# Patient Record
Sex: FEMALE | Race: WHITE | URBAN - METROPOLITAN AREA
[De-identification: names, ages, dates, MRNs, and addresses within clinical notes are randomized per-mention and may not be internally consistent; named-entity substitution may affect disease eponyms.]

---

## 2020-05-04 ENCOUNTER — HISTORICAL (OUTPATIENT)
Dept: RADIOLOGY | Facility: HOSPITAL | Age: 60
End: 2020-05-04

## 2020-05-06 ENCOUNTER — HISTORICAL (OUTPATIENT)
Dept: ADMINISTRATIVE | Facility: HOSPITAL | Age: 60
End: 2020-05-06

## 2020-05-06 LAB
ABS NEUT (OLG): 3.55 X10(3)/MCL (ref 2.1–9.2)
BASOPHILS # BLD AUTO: 0 X10(3)/MCL (ref 0–0.2)
BASOPHILS NFR BLD AUTO: 1 %
EOSINOPHIL # BLD AUTO: 0.4 X10(3)/MCL (ref 0–0.9)
EOSINOPHIL NFR BLD AUTO: 6 %
ERYTHROCYTE [DISTWIDTH] IN BLOOD BY AUTOMATED COUNT: 12.7 % (ref 11.5–14.5)
HCT VFR BLD AUTO: 38.6 % (ref 35–46)
HGB BLD-MCNC: 12.6 GM/DL (ref 12–16)
IMM GRANULOCYTES # BLD AUTO: 0.02 10*3/UL
IMM GRANULOCYTES NFR BLD AUTO: 0 %
LYMPHOCYTES # BLD AUTO: 2.1 X10(3)/MCL (ref 0.6–4.6)
LYMPHOCYTES NFR BLD AUTO: 32 %
MCH RBC QN AUTO: 29 PG (ref 26–34)
MCHC RBC AUTO-ENTMCNC: 32.6 GM/DL (ref 31–37)
MCV RBC AUTO: 88.9 FL (ref 80–100)
MONOCYTES # BLD AUTO: 0.4 X10(3)/MCL (ref 0.1–1.3)
MONOCYTES NFR BLD AUTO: 7 %
NEUTROPHILS # BLD AUTO: 3.55 X10(3)/MCL (ref 2.1–9.2)
NEUTROPHILS NFR BLD AUTO: 54 %
PLATELET # BLD AUTO: 243 X10(3)/MCL (ref 130–400)
PMV BLD AUTO: 10.1 FL (ref 7.4–10.4)
RBC # BLD AUTO: 4.34 X10(6)/MCL (ref 4–5.2)
WBC # SPEC AUTO: 6.6 X10(3)/MCL (ref 4.5–11)

## 2020-05-07 ENCOUNTER — HISTORICAL (OUTPATIENT)
Dept: ADMINISTRATIVE | Facility: HOSPITAL | Age: 60
End: 2020-05-07

## 2020-05-12 ENCOUNTER — HISTORICAL (OUTPATIENT)
Dept: SURGERY | Facility: HOSPITAL | Age: 60
End: 2020-05-12

## 2022-04-10 ENCOUNTER — HISTORICAL (OUTPATIENT)
Dept: ADMINISTRATIVE | Facility: HOSPITAL | Age: 62
End: 2022-04-10

## 2022-04-29 VITALS
OXYGEN SATURATION: 100 % | BODY MASS INDEX: 26.37 KG/M2 | HEIGHT: 67 IN | SYSTOLIC BLOOD PRESSURE: 130 MMHG | DIASTOLIC BLOOD PRESSURE: 79 MMHG | WEIGHT: 168 LBS

## 2022-05-04 NOTE — HISTORICAL [facility] CERNER
This is a historical note converted from [employer]. Formatting and pictures may have been removed.  Please reference [employer] for original formatting and attached multimedia. History of Present Illness  Patient is a 59?postmenopausal ?with LMP . She was originally referred to our clinic for gyn well woman exam with noted prolapsing cervical mass that Dr. Gambino biopsied in clinic on 2020. Pathology resulted as benign endocervical polyp. Patient reports episode of PMB. FamHx is notable for mother with PMB and uterine cancer in her 70s. Denies any current bleeding, has only had some spotting.?  ?  Gynecological History  Menstrual Status Intake: Postmenopausal  H/o BTL at age 29  MMG 2017 normal per patient, has one scheduled 2020  Pap 2017 normal per patient   x2  ?   SocialHx: Works as a /personal business. Her support is a local friend who will help take care of her in the postoperative setting. Juliana is out of state in [place].  Review of Systems  CONSTITUTIONAL: No unintended weight loss, subjective?fever, chills, weakness or fatigue.  HEENT:  -Eyes: No vision loss or?blurred vision.  -Ears, Nose, Throat: No hearing loss, sneezing, congestion, runny nose or sore throat.  SKIN: No rash or itching.  CARDIOVASCULAR: No chest pain, pressure or discomfort. No palpitations or edema.  RESPIRATORY: No shortness of breath, cough, or sputum.  GASTROINTESTINAL: No nausea, vomiting, constipation or diarrhea. No abdominal pain. No hematochezia.  GENITOURINARY: No hematuria or dysuria.  NEUROLOGICAL: No headache, dizziness, numbness or tingling in the extremities. No change in bowel or bladder control.  MUSCULOSKELETAL: No muscle, back pain, joint pain or stiffness.  Physical Exam  General Appearance: WNWD athletic female appearing her stated age. Alert, cooperative, in no acute distress  HEENT: NCAT, EOMI  Heart: Regular rate and rhythm, no murmur, rub or gallop?  Lungs: Clear to auscultation  bilaterally, respirations unlabored?  Abdomen: Soft, non-tender, non-distended abdomen with bowel sounds active all four quadrants, no masses, no organomegaly.  Extremities: Extremities normal, atraumatic, no cyanosis or edema. No calf tenderness bilaterally?  Skin: Warm, dry, intact with no rashes or lesions  ?   Pelvic Exam as Documented by Dr. Gambino 4/1/2020  BME: small? uterus, antiflexed  Speculum:? atrophic vaginal epithelium, 4x3 cm prolapsing mass with 1.5 cm stalk noted from endocervix, unable to visualize cervix.? No evidence of malignancy with extension of mass beyond lower endocervix.  Assessment/Plan  1.?Postmenopausal bleeding?N95.0  Counseling: Discussed differential diagnosis for?postmenopausal bleeding?including endometrial polyp, leiomyoma, uterine malignancy. Patient risk factors for endometrial hyperplasia/cancer reviewed. Indication for endometrial sampling reviewed. Alternatives to this planned procedure were explained to the patient, including medical and other surgical management.? This procedure and its risks, benefits, complications (including injury to bowel, bladder, major blood vessel, ureter, bleeding, possibility of transfusion, infection, scarring, dyspareunia, erosion, further surgery, incontinence, failure of the procedure, or fistula formation). We also discussed the risk of increased risk for uterine scarring (Asherman Syndrome).? Additional risks specific to this patient and procedure include uterine perforation, inability to obtain sample due to stenosis and the need for possible LEEP to access endometrial cavity.??Patient counseled on risk of blood transfusion including but not limited to allergic reaction, transmission of HIV, Hep C 1:2 million, transmission of Hep B 1:250,000.  2.?Endocervical polyp?N84.1  Noted on pathology from office biopsies 4/1/2020. Plan for hysteroscopic resection of endocervical polyp. See above for additional counseling.  3.?Preop  examination?Z01.818  PACE appointment requested.  Consents reviewed with patient and signed.  Labs today: CBC  Labs DOS: None  Meds DOS: None  Caprini score?2; SCDs for DVT prophylaxis  Abx: None indicated  Discussed?outpatient surgery expectations and recovery time.  ?   To OR for Hysteroscopy D&C on 5/12/2020 with Dr. Rose Alonzo  Post-op appt on 5/29/20   Problem List/Past Medical History  Ongoing  Hypertension  Historical  Hypertension  Pregnant  Pregnant  Procedure/Surgical History  Tubal ligation   Medications  lisinopril 10 mg oral tablet, 10 mg= 1 tab(s), Oral, Daily  Allergies  No Known Medication Allergies  Social History  Abuse/Neglect  No, 04/01/2020  Alcohol  Current, 1-2 times per month, 04/01/2020  Employment/School  Employed, 04/01/2020  Exercise  Exercise duration: 45. Exercise frequency: 5-6 times/week. Exercise type: Cross Fit and Boot Camp., 04/01/2020  Home/Environment  Lives with Alone., 04/01/2020  Nutrition/Health  Regular, 04/01/2020  Sexual  Sexually active: No. Sexual orientation: Straight or heterosexual. No, 04/01/2020  Substance Use  Past, Marijuana, 04/01/2020  Tobacco  Never (less than 100 in lifetime), N/A, 04/01/2020  Marital Status    Family History  Breast cancer: Sister.  Cancer: Mother.Negative: Father.  Uterine cancer: Mother.  Diagnostic Results  (05/04/2020 09:45 CDT US Pelvic Non-OB w Transvag if needed)  FINDINGS:  The uterus measures 7 cm in length. Endometrial stripe thickness 6-7  mm as visualized. Multiple uterine lesions noted measuring up to 2-3  cm as visualized sonographically. Right ovary unremarkable and the  left is not visualized. No sizable ascites.  ?  IMPRESSION:  Probable leiomyomata [1]     [1]?US Pelvic Non-OB w Transvag if needed; Lior YOUSSEF, Josie Ernst 05/04/2020 09:45 CDT   I have seen this patient and agree with note above.  Prolapsing cervical polyp with other fibroids on US-? I explained there may be additional endometrial fibroids present  and if amenable to removal, we will do so.  ?   Informed consent obtained, specifically I reiterated the risks of: persistent bleeding, infection, *Leaving other uterine fibroids (she has larger intramural/submucosal that will remain), uterine perforation, inability to obtain adequate sample, finding of malignancy, medical conditions from undergoing general anesthetic, venous thromboembolism, or other unpredicted risks.  Patient able to describe planned procedure in her own words.  Expected recovery time reviewed, as well as normal postoperative course.

## 2022-05-04 NOTE — HISTORICAL [facility] CERNER
This is a historical note converted from [employer]. Formatting and pictures may have been removed.  Please reference [employer] for original formatting and attached multimedia. Date of surgery: 5/12/2020  ?   Preop diagnosis: Prolapsing Endometrial Mass, Suspected Leiomyoma  ?   Postop diagnosis: Same  ?   Procedure: Hysteroscopic Myomectomy,?D&C  ?  Staff physician: Dr. Rose Alonzo  ?  Resident physician: Dr. Austin Karimi ([id number]), surgeon and Dr. Jana Linton ([id number]), assist  ?  Anesthesia: General Endotracheal?  ?  IVF:? 1000 mL  ?  UOP: ?30 mL clear yellow urine?  ?  EBL: 5 mL  ?  Hysteroscopic Fluid Deficit:? 60 mL  Complications: none?  Findings: EUA revealed?normal external female?genitalia. Uterus small, mobile, cervix palpably dilated around protruding mass approximately 2-3 cm in?size with?broad base extending into cervical canal. ? Intraoperative findings revealed?red firm mass protruding?from cervix, broad base extending back to attach to endometrium at uterine fundus. Small polyp noted near left?tubal ostia.?Left tubal ostia visualized. Right tubal ostia not initially?visualized, obscured by mass, but visualized after resection.  ?  Procedure Details:  The patient was taken to the operating room with IV fluids running. General anesthesia was obtained without difficulty. The patient was prepped and draped in routine sterile fashion in the dorsal lithotomy position. Bladder was drained using red rubber.  A weighted speculum and right angle retractor were inserted into the vagina and the cervix was visualized. A?single-tooth tenaculum was placed on the anterior lip of the cervix. The 5 mm?hysteroscope was introduced through the cervix under direct visualization. The uterine cavity was inspected?with findings were as above. ?The hysteroscope was removed and a myosure was introduced and used to systematically resect the base of the suspected fibroid mass. Myosure was removed and the mass was grasped with a tumor  tenaculum and gently twisted until it came free. A small remnant of tissue was then noted at the endocervical canal and removed with graspers. Myosure was reintroduced and remaining portion of the mass was resected systematically. Myosure was then removed. Endometrial curretage was then?performed circumferentially until a gritty feeling was noted in all aspects. Specimens were sent to pathology for review. All instruments were removed. Tenaculum sites were noted to be hemostatic.  Counts were correct times two. Dr. Alonzo was present for the entire procedure. The patient tolerated the procedure well.?  ?  ?   This certifies I was present during the entire period between opening and closing of the surgical field.  ?  PreOp Diagnosis:? large (3-4cm) prolapsing cervical mass, polyp on biopsy in office  PostOp Diagnosis: prolapsing endometrial mass, suspected leiomyoma  ?  Vasopressin injected prior to procedure start.? Hysteroscopy performed prior to tenaculum placement and cavity evaluated.?  I personally assisted.

## 2022-05-04 NOTE — HISTORICAL [facility] CERNER
This is a historical note converted from [employer]. Formatting and pictures may have been removed.  Please reference [employer] for original formatting and attached multimedia. Interval H&P  Patient seen and examined this morning and there are no changes to the H&P as below. She is notably a Jehovahs witness and has provided a list of acceptable products - in her chart. She can verbalize the procedure shell be undergoing. She is accompanied by her friend, Cherie [phone]  ?   Vitals:  /88, 141/83 on repeat  Pulse 59  RR 20  Afebrile  ?   NAD  RRR  CTABL, normal work of breathing  Abdomen nontender  ?   Plan: To OR for Hysteroscopic Polypectomy vs Myomectomy and D&C  ?  ?  History of Present Illness  Patient is a 59?postmenopausal ?with LMP . She was originally referred to our clinic for gyn well woman exam with noted prolapsing cervical mass that Dr. Gambino biopsied in clinic on 2020. Pathology resulted as benign endocervical polyp. Patient reports episode of PMB. FamHx is notable for mother with PMB and uterine cancer in her 70s. Denies any current bleeding, has only had some spotting.?  ?  Gynecological History  Menstrual Status Intake: Postmenopausal  H/o BTL at age 29  MMG 2017 normal per patient, has one scheduled 2020  Pap 2017 normal per patient   x2  ?  SocialHx: Works as a /personal business. Her support is a local friend who will help take care of her in the postoperative setting. Juliana is out of state in [place].  Review of Systems  CONSTITUTIONAL: No unintended weight loss, subjective?fever, chills, weakness or fatigue.  HEENT:  -Eyes: No vision loss or?blurred vision.  -Ears, Nose, Throat: No hearing loss, sneezing, congestion, runny nose or sore throat.  SKIN: No rash or itching.  CARDIOVASCULAR: No chest pain, pressure or discomfort. No palpitations or edema.  RESPIRATORY: No shortness of breath, cough, or sputum.  GASTROINTESTINAL: No nausea, vomiting, constipation  or diarrhea. No abdominal pain. No hematochezia.  GENITOURINARY: No hematuria or dysuria.  NEUROLOGICAL: No headache, dizziness, numbness or tingling in the extremities. No change in bowel or bladder control.  MUSCULOSKELETAL: No muscle, back pain, joint pain or stiffness.  ?  Physical Exam  General Appearance: WNWD athletic female appearing her stated age. Alert, cooperative, in no acute distress  HEENT: NCAT, EOMI  Heart: Regular rate and rhythm, no murmur, rub or gallop?  Lungs: Clear to auscultation bilaterally, respirations unlabored?  Abdomen: Soft, non-tender, non-distended abdomen with bowel sounds active all four quadrants, no masses, no organomegaly.  Extremities: Extremities normal, atraumatic, no cyanosis or edema. No calf tenderness bilaterally?  Skin: Warm, dry, intact with no rashes or lesions  ?  Pelvic Exam as Documented by Dr. Gambino 4/1/2020  BME: small? uterus, antiflexed  Speculum:? atrophic vaginal epithelium, 4x3 cm prolapsing mass with 1.5 cm stalk noted from endocervix, unable to visualize cervix.? No evidence of malignancy with extension of mass beyond lower endocervix.  Assessment/Plan  1.?Postmenopausal bleeding?N95.0  ???Counseling: Discussed differential diagnosis for?postmenopausal bleeding?including endometrial polyp, leiomyoma, uterine malignancy. Patient risk factors for endometrial hyperplasia/cancer reviewed. Indication for endometrial sampling reviewed. Alternatives to this planned procedure were explained to the patient, including medical and other surgical management.? This procedure and its risks, benefits, complications (including injury to bowel, bladder, major blood vessel, ureter, bleeding, possibility of transfusion, infection, scarring, dyspareunia, erosion, further surgery, incontinence, failure of the procedure, or fistula formation). We also discussed the risk of increased risk for uterine scarring (Asherman Syndrome).? Additional risks specific to this patient and  procedure include uterine perforation, inability to obtain sample due to stenosis and the need for possible LEEP to access endometrial cavity.??Patient counseled on risk of blood transfusion including but not limited to allergic reaction, transmission of HIV, Hep C 1:2 million, transmission of Hep B 1:250,000.  ?  2.?Endocervical polyp?N84.1  ??Noted on pathology from office biopsies 4/1/2020. Plan for hysteroscopic resection of endocervical polyp. See above for additional counseling.  ?  3.?Preop examination?Z01.818  ??PACE appointment requested.  Consents reviewed with patient and signed.  Labs today: CBC  Labs DOS: None  Meds DOS: None  Caprini score?2; SCDs for DVT prophylaxis  Abx: None indicated  Discussed?outpatient surgery expectations and recovery time.  ?  ??To OR for Hysteroscopy D&C on 5/12/2020 with Dr. Rose Alonzo  Post-op appt on 5/29/20 Problem List/Past Medical History  Ongoing  ??Hypertension  Historical  ??Hypertension  ??Pregnant  ??Pregnant  Procedure/Surgical HistoryTubal ligation   ?  Medications  ?lisinopril 10 mg oral tablet, 10 mg= 1 tab(s), Oral, Daily  Allergies  No Known Medication Allergies  Social History  Abuse/Neglect  ?No, 04/01/2020  Alcohol  ?Current, 1-2 times per month, 04/01/2020  Employment/School  ?Employed, 04/01/2020  Exercise  ?Exercise duration: 45. Exercise frequency: 5-6 times/week. Exercise type: Cross Fit and Boot Camp., 04/01/2020  Home/Environment  ?Lives with Alone., 04/01/2020  Nutrition/Health  ?Regular, 04/01/2020  Sexual  ?Sexually active: No. Sexual orientation: Straight or heterosexual. No, 04/01/2020  Substance Use  ?Past, Marijuana, 04/01/2020  Tobacco  ?Never (less than 100 in lifetime), N/A, 04/01/2020  ?Marital Status  ?  Family History  ?Breast cancer: Sister.  ?Cancer: Mother.Negative: Father.  ?Uterine cancer: Mother.  Diagnostic Results  (05/04/2020 09:45 CDT US Pelvic Non-OB w Transvag if needed)  FINDINGS:  The uterus measures 7 cm in  length. Endometrial stripe thickness 6-7  mm as visualized. Multiple uterine lesions noted measuring up to 2-3  cm as visualized sonographically. Right ovary unremarkable and the  left is not visualized. No sizable ascites.  ?  IMPRESSION:  Probable leiomyomata [1]   ?  [1]?US Pelvic Non-OB w Transvag if needed; Lior YOUSSEF, Josie Ernst 05/04/2020 09:45 CDT  Addendum by [place] YOUSSEF, May S on May 11, 2020 08:22:12 CDT (Verified)  I have seen this patient and agree with note above.  Prolapsing cervical polyp with other fibroids on US-? I explained there may be additional endometrial fibroids present and if amenable to removal, we will do so.  ?  Informed consent obtained, specifically I reiterated the risks of: persistent bleeding, infection, *Leaving other uterine fibroids (she has larger intramural/submucosal that will remain), uterine perforation, inability to obtain adequate sample, finding of malignancy, medical conditions from undergoing general anesthetic, venous thromboembolism, or other unpredicted risks.  Patient able to describe planned procedure in her own words.  Expected recovery time reviewed, as well as normal postoperative course.   I have seen the patient today preoperatively and she is able to state procedure in her own words.